# Patient Record
Sex: MALE | Race: WHITE | Employment: UNEMPLOYED | URBAN - NONMETROPOLITAN AREA
[De-identification: names, ages, dates, MRNs, and addresses within clinical notes are randomized per-mention and may not be internally consistent; named-entity substitution may affect disease eponyms.]

---

## 2018-03-16 ENCOUNTER — HOSPITAL ENCOUNTER (INPATIENT)
Age: 23
LOS: 4 days | Discharge: HOME | DRG: 754 | End: 2018-03-20
Attending: PSYCHIATRY & NEUROLOGY | Admitting: PSYCHIATRY & NEUROLOGY
Payer: COMMERCIAL

## 2018-03-16 PROBLEM — F32.9 MAJOR DEPRESSIVE DISORDER WITH SINGLE EPISODE: Status: ACTIVE | Noted: 2018-03-16

## 2018-03-16 PROBLEM — E44.0 MODERATE MALNUTRITION (HCC): Chronic | Status: ACTIVE | Noted: 2018-03-16

## 2018-03-16 PROCEDURE — 1240000000 HC EMOTIONAL WELLNESS R&B

## 2018-03-16 PROCEDURE — 6370000000 HC RX 637 (ALT 250 FOR IP): Performed by: PSYCHIATRY & NEUROLOGY

## 2018-03-16 PROCEDURE — 90792 PSYCH DIAG EVAL W/MED SRVCS: CPT | Performed by: NURSE PRACTITIONER

## 2018-03-16 RX ORDER — MAGNESIUM HYDROXIDE/ALUMINUM HYDROXICE/SIMETHICONE 120; 1200; 1200 MG/30ML; MG/30ML; MG/30ML
30 SUSPENSION ORAL PRN
Status: DISCONTINUED | OUTPATIENT
Start: 2018-03-16 | End: 2018-03-20 | Stop reason: HOSPADM

## 2018-03-16 RX ORDER — NICOTINE 21 MG/24HR
1 PATCH, TRANSDERMAL 24 HOURS TRANSDERMAL DAILY
Status: DISCONTINUED | OUTPATIENT
Start: 2018-03-16 | End: 2018-03-20 | Stop reason: HOSPADM

## 2018-03-16 RX ORDER — TRAZODONE HYDROCHLORIDE 50 MG/1
50 TABLET ORAL NIGHTLY PRN
Status: DISCONTINUED | OUTPATIENT
Start: 2018-03-16 | End: 2018-03-20 | Stop reason: HOSPADM

## 2018-03-16 RX ORDER — ACETAMINOPHEN 325 MG/1
650 TABLET ORAL EVERY 4 HOURS PRN
Status: DISCONTINUED | OUTPATIENT
Start: 2018-03-16 | End: 2018-03-20 | Stop reason: HOSPADM

## 2018-03-16 RX ORDER — HYDROXYZINE PAMOATE 25 MG/1
25 CAPSULE ORAL 3 TIMES DAILY PRN
Status: DISCONTINUED | OUTPATIENT
Start: 2018-03-16 | End: 2018-03-20 | Stop reason: HOSPADM

## 2018-03-16 ASSESSMENT — SLEEP AND FATIGUE QUESTIONNAIRES
DIFFICULTY STAYING ASLEEP: YES
DIFFICULTY FALLING ASLEEP: YES
SLEEP PATTERN: DIFFICULTY FALLING ASLEEP;RESTLESSNESS;DISTURBED/INTERRUPTED SLEEP
AVERAGE NUMBER OF SLEEP HOURS: 4
DIFFICULTY ARISING: NO
RESTFUL SLEEP: NO
DO YOU HAVE DIFFICULTY SLEEPING: YES

## 2018-03-16 ASSESSMENT — LIFESTYLE VARIABLES: HISTORY_ALCOHOL_USE: NO

## 2018-03-16 ASSESSMENT — PAIN SCALES - GENERAL: PAINLEVEL_OUTOF10: 0

## 2018-03-16 ASSESSMENT — PATIENT HEALTH QUESTIONNAIRE - PHQ9: SUM OF ALL RESPONSES TO PHQ QUESTIONS 1-9: 11

## 2018-03-16 NOTE — PROGRESS NOTES
Group Therapy Note    Date: 3/16/2018  Start Time: 1330  End Time:  1430  Number of Participants: 9    Type of Group: Psychotherapy    Wellness Binder Information  Module Name:  Relationships and recovery  Session Number: NA    Notes:  Pt is present for group and participated well with peers. Peers identified aspects of being on this unit which help them to feel safe and identified ways in which they can create similar experiences in their own home. Identified forms of social support and how ti help loved ones to understand how to be supportive. Identified with common peer experience. Status After Intervention:  Improved    Participation Level:  Active Listener and Interactive    Participation Quality: Appropriate, Attentive, Sharing and Supportive      Speech:  normal      Thought Process/Content: Logical  Linear      Affective Functioning: Congruent      Mood: Dysphoric      Level of consciousness:  Alert, Oriented x4 and Attentive      Response to Learning: Able to verbalize current knowledge/experience, Able to verbalize/acknowledge new learning, Able to retain information, Capable of insight, Able to change behavior and Progressing to goal      Endings: None Reported    Modes of Intervention: Education, Support, Socialization, Exploration, Clarifying, Problem-solving and Activity      Discipline Responsible: /Counselor      Signature:  ANA Cristobal

## 2018-03-16 NOTE — BH NOTE
Patient attended both the goal group and the recreation/social group today. Patient was social with others and demonstrated fair concentration.

## 2018-03-16 NOTE — PROGRESS NOTES
11:00- Encouraged client to attend cognitive skills group- Patient declined and remained resting in room.     Sofia Oquendo, LPC
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
## 2018-03-16 NOTE — PROGRESS NOTES
Behavioral Health   Admission Note     Admission Type:   Admission Type: Involuntary    Reason for admission:  Reason for Admission: \"Suicidal\"    PATIENT STRENGTHS:  Strengths: No significant Physical Illness, Motivated, Communication, Positive Support    Patient Strengths and Limitations:  Limitations: Inappropriate/potentially harmful leisure interests, Difficulty problem solving/relies on others to help solve problems    Addictive Behavior:   Addictive Behavior  In the past 3 months, have you felt or has someone told you that you have a problem with:  : None  Do you have a history of Chemical Use?: No  Do you have a history of Alcohol Use?: No  Do you have a history of Street Drug Abuse?: Yes  Histroy of Prescripton Drug Abuse?: No    Medical Problems:   Past Medical History:   Diagnosis Date    Psychiatric problem        Status EXAM:  Status and Exam  Normal: Yes  Facial Expression: Brightened  Affect: Appropriate  Level of Consciousness: Alert  Mood:Normal: No  Mood: Depressed  Motor Activity:Normal: Yes  Interview Behavior: Cooperative  Preception: [place] to Person, [place] to Time, [place] to Place, [place] to Situation  Attention:Normal: Yes  Thought Processes: Circumstantial  Thought Content:Normal: No  Thought Content: Paranoia  Hallucinations: None (prior to admission)  Delusions: No  Memory:Normal: Yes  Insight and Judgment: No  Insight and Judgment: Poor Judgment, Poor Insight  Present Suicidal Ideation: No (no longer feeling suicidal)  Present Homicidal Ideation: No    Pt admitted with followings belongings:  Dentures: None  Vision - Corrective Lenses: None  Hearing Aid: None  Jewelry: None  Body Piercings Removed: N/A  Clothing:  Footwear, Socks, Undergarments (Comment), Pants, Shirt (sweatshirt;belt and thermal underwear)  Were All Patient Medications Collected?: Not Applicable  Other Valuables: Wallet (ID)     Valuables sent home with Not applicable Valuables placed in safe in security envelope, number:  [id number] Patient's home medications were Not applicable. Patient oriented to surroundings and program expectations and copy of patient rights given. Received admission packet:  Yes. Consents reviewed, signed Yes   Patient verbalize understanding:  Yes   Patient education on precautions: Yes          Provided pt with [employer] handout entitled \"Quitting Smoking. \"  Reviewed handout with pt addressing dangers of smoking, developing coping skills, and providing basic information about quitting. Pt response to counseling:  Patient is not ready to quit. This is a 25year old  male admitted to the Adult Behavioral Service Department  As a direct admit from the [facility] in [place], [place] treatment of Major Depressive Disorder a single episode with psychosis. It was reported that the patient  Had been having suicidal thoughts since March 2nd after the police had found drug paraphernalia on him and her currently has charges pending per the patient report. The patient reported to the ED staff RN that he would rather die than go to retirement. The patient reports that earlier yesterday he stood on a train track until the train came although aborted the idea of committing suicide  By train. The patient also had superficially cut his left wrist with a paring knife as a suicidal gesture. Prior to going to the [facility] the patient presented to the [place] [id number] telling them that he was going to slit his throat . The patient was then transported to the hospital for evaluation via squad. It also was reported that the patient was having command hallucinations that were suicidal in nature. The patient during the admission assessment denied  Voices although did subscribe to feeling paranoid and feeling as though he needed to look over his shoulder frequently to make sure no one ws after him. The patient denies suicidal thoughts during the admission assessment although

## 2018-03-16 NOTE — FLOWSHEET NOTE
03/16/18 1140   Encounter Summary   Services provided to: Patient   Referral/Consult From: [facility], [place]   Place of [address] [place] [address] [place] Visiting Yes  (3/16 )   Complexity of Encounter Moderate   Length of Encounter 15 minutes   Spiritual/Roman Catholic   Type Spiritual support   Assessment Approachable   Intervention Prayer;Nurtured hope;Explored coping resources; Explored feelings, thoughts, concerns; Active listening   Outcome Encouraged;Coping   Initial Spiritual Care Contact:     Subjective:  Patient consulted spiritual care to have a  to see him. Objective:  Pt was watching TV on the Behavioral unit, but was willing to go to [employer] to talk. He stated his feeling as being less nervous today than yesterday. Assessment:  Patient is according to his words, \" a slow learner. \"  He talked about his use of MJ and his fear of going to [facility], which is why he stated he attempted suicide. Pt talked as I listened, then this  offered prayer. We held hands and prayed together for his healing of body, mind and spirit. I encouraged his to attend groups and to find good role models in friends and family to follow . Spiritual care card with [employer], prayer or Prayer for peace was given. It has our information of service, hours and phone number on it.       Plan:

## 2018-03-17 PROCEDURE — 1240000000 HC EMOTIONAL WELLNESS R&B

## 2018-03-17 PROCEDURE — 99231 SBSQ HOSP IP/OBS SF/LOW 25: CPT | Performed by: PSYCHIATRY & NEUROLOGY

## 2018-03-17 PROCEDURE — 6370000000 HC RX 637 (ALT 250 FOR IP): Performed by: PSYCHIATRY & NEUROLOGY

## 2018-03-17 RX ADMIN — SERTRALINE 50 MG: 50 TABLET, FILM COATED ORAL at 12:59

## 2018-03-17 ASSESSMENT — PAIN SCALES - GENERAL: PAINLEVEL_OUTOF10: 0

## 2018-03-17 NOTE — PROGRESS NOTES
03/17/18 1:41 PM    SW attempted to contact patient's aunt, Kimber Manuel. No answer and no answering machine. Will attempt to call again. Patient is currently living with his grandmother however there is no contact information for her in patient's chart. Patient does not know number off hand.      ANA Hawkins

## 2018-03-17 NOTE — PROGRESS NOTES
This RN has reviewed and agrees with Cortez Santacruz LPN's data collection and has collaborated with this LPN regarding the patient's care plan.

## 2018-03-17 NOTE — PROGRESS NOTES
Department of Psychiatry  Attending Progress Note    CHIEF COMPLAINT:  \"Thoughts to hurt self and I cut my wrist\"     HISTORY OF PRESENT ILLNESS:  Linda Martinez is a 25 y.o. single unemployed  male with significant past psych history of depressive disorder and marijuana use disorder who presented to the unit as a direct admit from the [facility] in [facility] for mental health evaluation. Patient cut his left wrist with the intent to hurt self, but changed his mind before doing any major harm.      Patient seen and reports worsening depression since the past 2 weeks. Reports that he has been having thoughts to hurt self since March 2nd after the police found drug paraphernalia on him. Patient has current charge for possession pending. The patient reports that yesterday he became so worried about the thought of going to correction for over a year that he started to cut his wrist. Has a cut on his left wrist after an aborted attempt to kill self. After he changed his mind to cut self, he was on the rail track with the intent to be killed by a train, which idea he later aborted as well. Following these attempts and thoughts to hurt self, he decided to see and speak to the  that had him down for possession, to request that he drops the charges. He was asked to tell where he gets the marijuana before the charges will be dropped. Patient says he does not want to get any one in trouble--particularly family and told the officer that he would rather die than go to correction. He told them at the [place] [address] that he was going to slit his throat. He was taken to the hospital as a result.       He reports being very sad and down since the past 2 weeks. Has felt helpless, worried and hopeless because of the possibility of going to correction. He reports poor sleep, with little motivation and issues with concentration with occasional thoughts to hurt self.  Reports he has felt this way in the past, having

## 2018-03-17 NOTE — PROGRESS NOTES
Group Therapy Note    Date: 3/17/2018  Start Time: 1630  End Time:  1700  Number of Participants: 7    Type of Group: Healthy Living/Wellness  Notes:  attended    Status After Intervention:  Improved    Participation Level:  Active Listener and Interactive    Participation Quality: Appropriate, Attentive and Sharing      Speech:  normal      Thought Process/Content: Logical      Affective Functioning: Flat    Level of consciousness:  Alert and Oriented x4      Response to Learning: Able to verbalize/acknowledge new learning      Endings: None Reported    Modes of Intervention: Education, Support and Socialization      Discipline Responsible: Licensed Practical Nurse      Signature:  Romelle Lanes, LPN

## 2018-03-18 PROCEDURE — 6370000000 HC RX 637 (ALT 250 FOR IP): Performed by: PSYCHIATRY & NEUROLOGY

## 2018-03-18 PROCEDURE — 99231 SBSQ HOSP IP/OBS SF/LOW 25: CPT | Performed by: NURSE PRACTITIONER

## 2018-03-18 PROCEDURE — 1240000000 HC EMOTIONAL WELLNESS R&B

## 2018-03-18 RX ADMIN — SERTRALINE 50 MG: 50 TABLET, FILM COATED ORAL at 08:28

## 2018-03-18 RX ADMIN — ACETAMINOPHEN 650 MG: 325 TABLET ORAL at 20:25

## 2018-03-18 ASSESSMENT — PAIN SCALES - GENERAL
PAINLEVEL_OUTOF10: 0
PAINLEVEL_OUTOF10: 3
PAINLEVEL_OUTOF10: 2
PAINLEVEL_OUTOF10: 0

## 2018-03-18 NOTE — PROGRESS NOTES
Psychiatry Progress Note        3-                    HPI:  Marline Perdomo is a 25 y.o. single unemployed  male with significant past psych history of depressive disorder and marijuana use disorder who presented to the unit as a direct admit from the [facility] in [facility] for mental health evaluation. Patient cut his left wrist with the intent to hurt self, but changed his mind before doing any major harm.      Patient seen and reports worsening depression since the past 2 weeks. Reports that he has been having thoughts to hurt self since March 2nd after the police found drug paraphernalia on him. Patient has current charge for possession pending. The patient reports that yesterday he became so worried about the thought of going to nursing home for over a year that he started to cut his wrist. Has a cut on his left wrist after an aborted attempt to kill self. After he changed his mind to cut self, he was on the rail track with the intent to be killed by a train, which idea he later aborted as well. Following these attempts and thoughts to hurt self, he decided to see and speak to the  that had him down for possession, to request that he drops the charges. He was asked to tell where he gets the marijuana before the charges will be dropped. Patient says he does not want to get any one in trouble--particularly family and told the officer that he would rather die than go to nursing home. He told them at the [place] [id number] that he was going to slit his throat. He was taken to the hospital as a result.       He reports being very sad and down since the past 2 weeks. Has felt helpless, worried and hopeless because of the possibility of going to nursing home. He reports poor sleep, with little motivation and issues with concentration with occasional thoughts to hurt self.  Reports he has felt this way in the past, having come from [place] where he lived in a front porch with cold air blowing through [place]  Affect: Reactive  Thought processes: Linear and goal directed   Suicidal Ideation: Denies suicidal ideations  Homicidal ideation: Denies homicidal ideations  Delusions: No evidence of delusions is observed  Perceptual Disturbance: Denies AH/VH;  No evidence of psychosis is observed. Cognition: Oriented to person, place, time and situation   Concentration fair   Memory intact   Insight: Poor  Judgment: Poor    Assessment:  Major depressive  Disorder, single episode with psychotic features  Rule out Bipolar Disorder  Mild intellectual disability  Marijuana use disorder     Plan:  Continue current meds as ordered  Continue to encourage group attendance. Salome Perry CNP  6-    I assessed this patient and reviewed the case and plan of care with Salome Perry CNP.   I have reviewed the above documentation and I agree with the findings and treatment & discharge plan as written    Electronically signed by Adriana Grigsby. on 3/18/2018 at 10:32 AM

## 2018-03-18 NOTE — PROGRESS NOTES
Group Therapy Note    Date: 3/18/2018  Start Time: 1630  End Time:  1700  Number of Participants: 7    Type of Group: Healthy Living/Wellness  Notes:  attended    Status After Intervention:  Improved    Participation Level:  Active Listener and Interactive    Participation Quality: Appropriate, Attentive and Sharing      Speech:  normal      Thought Process/Content: Logical      Affective Functioning: Flat      Level of consciousness:  Alert and Oriented x4      Response to Learning: Able to verbalize/acknowledge new learning      Endings: None Reported    Modes of Intervention: Education, Support and Socialization      Discipline Responsible: Licensed Practical Nurse      Signature:  Helen Whelan LPN

## 2018-03-19 PROCEDURE — 1240000000 HC EMOTIONAL WELLNESS R&B

## 2018-03-19 PROCEDURE — 6370000000 HC RX 637 (ALT 250 FOR IP): Performed by: PSYCHIATRY & NEUROLOGY

## 2018-03-19 PROCEDURE — 99232 SBSQ HOSP IP/OBS MODERATE 35: CPT | Performed by: PHYSICIAN ASSISTANT

## 2018-03-19 RX ADMIN — SERTRALINE 50 MG: 50 TABLET, FILM COATED ORAL at 08:22

## 2018-03-19 RX ADMIN — HYDROXYZINE PAMOATE 25 MG: 25 CAPSULE ORAL at 22:06

## 2018-03-19 RX ADMIN — TRAZODONE HYDROCHLORIDE 50 MG: 50 TABLET ORAL at 22:06

## 2018-03-19 ASSESSMENT — PAIN SCALES - GENERAL
PAINLEVEL_OUTOF10: 1
PAINLEVEL_OUTOF10: 0

## 2018-03-19 NOTE — PROGRESS NOTES
Date: 3/19/2018  Start Time: 1100                       End Time:  4198  Number of Participants: 8     Type of Group: Psychoeducation - Behavioral Activation      Notes:  Patient present in group. Patient active in group discussion and activity regarding behavioral activation. Patient able to verbalize current negative behaviors in daily routine, able to develop/replace positive behaviors.      Status After Intervention:  Improved     Participation Level:  Active Listener and Interactive     Participation Quality: Appropriate, Attentive, Sharing and Supportive        Speech:  normal        Thought Process/Content: Linear        Affective Functioning: Congruent        Mood: [place]ym[place]        Level of consciousness:  Alert, Oriented x4 and Attentive        Response to Learning: Able to verbalize current knowledge/experience, Able to verbalize/acknowledge new learning, Able to change behavior and Progressing to goal        Endings: None Reported     Modes of Intervention: Education, Support, Socialization, Exploration, Clarifying and Problem-solving        Discipline Responsible: /Counselor        Signature: ANA Armas

## 2018-03-19 NOTE — PLAN OF CARE
Problem: KNOWLEDGE DEFICIT,EDUCATION,DISCHARGE PLAN  Goal: Knowledge - personal safety  Outcome: Met This Shift  1. What are the warning signs when you begin thinking suicide or when you feel very distressed? \"None because I never had thoughts of suicide\" and \"I only ever had aggravation\". 2. What can you do by yourself to take your mind off of the problem? \"I get aggravation, I feel a small discomfort in my stomach which Is my first sign\"     3. If you are unable to deal with your distressed mood alone, contact trusted family members or friends. List several people in case your first choices are not available. Music, walking, thinking, funny comedy, talking to family, exercise, and warm shower. 4. Contact local professionals or emergency services if you continue to have suicidal thoughts or serious distress. Sadie Weinberg (Grandma)  Anya Prado (Aunt)  Quirino Hairston    SUICIDE PREVENTION [facility] PHONE NUMBERS:    [facility] [place] phone number [facility] [address] at [address], [address] [place] [address] Road          [phone]        [phone]        [phone] (for deaf or hearing impaired)       Comments: Care plan reviewed with patient. Patient verbalized understanding of the plan of care and contribute to goal setting.
Problem: Social interaction  Goal: Increased social interaction  Outcome: Met This Shift  Patient has attended all of the groups so far today so he has met his socialization goal for this shift.
KNOWLEDGE DEFICIT,EDUCATION,DISCHARGE PLAN  Goal: Knowledge - personal safety  Outcome: Ongoing  The patient verbalizes feeling safe on the unit. Problem: Discharge Planning:  Goal: Discharged to appropriate level of care  Discharged to appropriate level of care   Outcome: Ongoing  Patient will be discharged to safe, appropriate level of care. Patient will work with interdisciplinary team towards meeting discharge needs. Problem: Mood - Altered:  Goal: Mood stable  Mood stable   Outcome: Ongoing  the patient rates mood as a 9 out of 10. Problem: Anxiety:  Goal: Level of anxiety will decrease  Level of anxiety will decrease   Outcome: Ongoing  the patient denies anxiety. Problem: Nutrition  Goal: Optimal nutrition therapy  Outcome: Ongoing  the patient ate 100% of evening snack. Comments: Care plan reviewed with patient. Patient verbalize understanding of the plan of care and contribute to goal setting.
Knowledge - personal safety  Outcome: Ongoing  To be completed    Problem: Discharge Planning:  Goal: Discharged to appropriate level of care  Discharged to appropriate level of care   Outcome: Ongoing  To be discussed    Problem: Mood - Altered:  Goal: Mood stable  Mood stable   Outcome: Met This Shift  stable    Problem: Anxiety:  Goal: Level of anxiety will decrease  Level of anxiety will decrease   Outcome: Ongoing  Denies anxiety at this time    Problem: Nutrition  Goal: Optimal nutrition therapy  Outcome: Met This Shift  Offered with meals    Comments: Care plan reviewed with patient   Patient  verbalizes understanding of the plan of care and did not contribute to goal setting.
improve  Sleeping patterns will improve   Outcome: Met This Shift  Patient resting quietly. Problem: KNOWLEDGE DEFICIT,EDUCATION,DISCHARGE PLAN  Goal: Knowledge - personal safety  Outcome: Ongoing  Encouraged to complete safety plan. Problem: Discharge Planning:  Goal: Discharged to appropriate level of care  Discharged to appropriate level of care   Outcome: Ongoing  Working with multidisciplinary treatment team for ongoing continuity of care and relapse prevention. Problem: Mood - Altered:  Goal: Mood stable  Mood stable   Outcome: Met This Shift  Rates mood #7/10 at time of assessment. Problem: Anxiety:  Goal: Level of anxiety will decrease  Level of anxiety will decrease   Outcome: Met This Shift  Denies feelings of anxiety at time of assessment. Problem: Nutrition  Goal: Optimal nutrition therapy  Outcome: Ongoing  Patient has good appetite.
nutrition will improve  Maintenance of adequate nutrition will improve   Outcome: Ongoing  Food and fluids taken and tolerated well    Problem: Activity:  Goal: Sleeping patterns will improve  Sleeping patterns will improve   Outcome: Ongoing  Slept 7.5 hours continuous last night. Sleeping when checked at 15 minute purposeful rounds tonight. Problem: Discharge Planning:  Goal: Discharged to appropriate level of care  Discharged to appropriate level of care   Outcome: Ongoing  Discharge planners working with patient to achieve optimal discharge plan, specific to the needs of this patient. Problem: Mood - Altered:  Goal: Mood stable  Mood stable   Outcome: Ongoing  Has been cooperative with not acting out this shift    Problem: Anxiety:  Goal: Level of anxiety will decrease  Level of anxiety will decrease   Outcome: Ongoing  Denies feeling anxious this shift. Was sitting in the dining room watching TV. Problem: Nutrition  Goal: Optimal nutrition therapy  Outcome: Ongoing  Duplicate charting    Comments: Care plan reviewed with patient. Patient verbalizes understanding of the plan of care and contributes to goal setting.
they develop any. She remains on 15 minute suicidal precautions for safety and unit protocols. Goal: Patient specific goal  Patient specific goal   Outcome: Ongoing  His goal for today is to \"attend all groups and finish coloring his picture\". Goal: Participates in care planning  Participates in care planning   Outcome: Met This Shift  Patient is compliant with medications, unit protocols, and setting daily goals for improved mental and emotional health. Problem: Altered Mood, Deterioration in Function:  Goal: Ability to perform activities of daily living will improve  Ability to perform activities of daily living will improve   Outcome: Ongoing  He is independent with ADL's and self care. Goal: Maintenance of adequate nutrition will improve  Maintenance of adequate nutrition will improve   Outcome: Ongoing  He is eating 90% of meals today. Problem: Activity:  Goal: Sleeping patterns will improve  Sleeping patterns will improve   Outcome: Ongoing  Patient reports sleeping well last night with improved sleep and feeling more rested. Will continue to monitor sleep cycle. And encourage patient to remain up out of bed not napping during day hours as to be involved in unit groups and promote better sleep at bedtime. Problem: KNOWLEDGE DEFICIT,EDUCATION,DISCHARGE PLAN  Goal: Knowledge - personal safety  Outcome: Ongoing  Patient is working toward completing their safety plan and their recovery program and resiliency. Problem: Discharge Planning:  Goal: Discharged to appropriate level of care  Discharged to appropriate level of care   Outcome: Ongoing  Working with multidisciplinary treatment team for ongoing continuity of care and relapse prevention. Problem: Mood - Altered:  Goal: Mood stable  Mood stable   Outcome: Ongoing  He admits to smoking cannabis but that when he got busted all he had was his pipe no marijuana. No withdrawal symptoms reported from drug use of cannabis.      Problem:

## 2018-03-19 NOTE — FLOWSHEET NOTE
03/18/18 2015   Encounter Summary   Services provided to: Patient   Referral/Consult From: Other disciplines   Support System Spouse; Children   Continue Visiting Yes  (3/18)   Complexity of Encounter Moderate   Length of Encounter 45 minutes   Grief and Life Adjustment   Type Adjustment to illness   Assessment Approachable   Intervention Explored feelings, thoughts, concerns;Prayer   Outcome Expressed gratitude   . Spiritual Support Group Note    Number of Participants in Group: 6                               Time: 8:15p    Goal: Relief from isolation and loneliness             Mady Sharing             Self-understanding and gain insight              Acceptance and belonging            Recognize they are not alone                Socialization             Empowerment       Encouragement    Topic:  [x] Spiritual Wellness and Self Care                  [x] Hope                     [] Connecting with Divine/Others        [x] Thankfulness and Gratitude               [x]  Meaningfulness and Purpose               [] Forgiveness               [] Peace               [] Connect to [employer]     [] Other:    Participation Level:   [x] Active Listener   [] Minimal   [] Monopolizing   [x] Interactive   [] No Participation   []  Other:     Attention:   [x] Alert   [] Distractible   [] Drowsy   [] Poor   [] Other:    Manner:   [x] Cooperative   [] Suspicious   [] Withdrawn   [] Guarded   [] Irritable   [] Inhospitable   [] Other:     Others Comments from Group: Very supportive of other patients convesation

## 2018-03-19 NOTE — PROGRESS NOTES
Group Therapy Note    Date: 3/18/2018  Start Time: 2000  End Time:  2015    Type of Group: Wrap-Up/Relaxation    Patient's Goal:  Go to groups and finish coloring picture    Notes:  States he is progressing toward his goal. Watched TV for relaxation. No peer interaction noted.     Status After Intervention:  Unchanged    Participation Level: Monopolizing    Participation Quality: Appropriate      Speech:  pressured      Thought Process/Content: Logical  Flight of ideas      Affective Functioning: Blunted and Flat      Mood: anxious      Level of consciousness:  Preoccupied      Response to Learning: Able to verbalize current knowledge/experience      Endings: None Reported    Modes of Intervention: Education, Support and Socialization      Discipline Responsible: Licensed Practical Nurse      Signature:  Jaqui Landrum LPN

## 2018-03-20 VITALS
OXYGEN SATURATION: 98 % | SYSTOLIC BLOOD PRESSURE: 121 MMHG | HEIGHT: 72 IN | HEART RATE: 67 BPM | TEMPERATURE: 97.5 F | WEIGHT: 155 LBS | BODY MASS INDEX: 20.99 KG/M2 | DIASTOLIC BLOOD PRESSURE: 66 MMHG | RESPIRATION RATE: 16 BRPM

## 2018-03-20 PROBLEM — E44.0 MODERATE MALNUTRITION (HCC): Chronic | Status: RESOLVED | Noted: 2018-03-16 | Resolved: 2018-03-20

## 2018-03-20 PROCEDURE — 99238 HOSP IP/OBS DSCHRG MGMT 30/<: CPT | Performed by: PSYCHIATRY & NEUROLOGY

## 2018-03-20 PROCEDURE — 5130000000 HC BRIDGE APPOINTMENT

## 2018-03-20 PROCEDURE — 6370000000 HC RX 637 (ALT 250 FOR IP): Performed by: PSYCHIATRY & NEUROLOGY

## 2018-03-20 RX ORDER — TRAZODONE HYDROCHLORIDE 50 MG/1
50 TABLET ORAL NIGHTLY PRN
Qty: 30 TABLET | Refills: 1 | Status: SHIPPED | OUTPATIENT
Start: 2018-03-20 | End: 2018-04-19

## 2018-03-20 RX ORDER — HYDROXYZINE PAMOATE 25 MG/1
25 CAPSULE ORAL 3 TIMES DAILY PRN
Qty: 42 CAPSULE | Refills: 0 | Status: SHIPPED | OUTPATIENT
Start: 2018-03-20 | End: 2018-04-03

## 2018-03-20 RX ADMIN — SERTRALINE 50 MG: 50 TABLET, FILM COATED ORAL at 08:34

## 2018-03-20 ASSESSMENT — PAIN SCALES - GENERAL: PAINLEVEL_OUTOF10: 0
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
## 2018-03-20 NOTE — PROGRESS NOTES
Behavioral Health   Discharge Note    Pt discharged with followings belongings:   Dentures: None  Vision - Corrective Lenses: None  Hearing Aid: None  Jewelry: None  Body Piercings Removed: N/A  Clothing: Other (Comment) (all  items returned to patient)  Were All Patient Medications Collected?: Not Applicable  Other Valuables: Wallet (ID)   Valuables sent home with n/a. Valuables retrieved from safe, Security envelope number:  [id number] and returned to patient. Patient left department with Departure Mode: Family member via Mobility at Departure: Ambulatory, discharged to Discharged to: Private Residence. \"An Important Message from [employer] About Your Rights\" form reviewed, signed n/a patient has medicaid. Patient education on aftercare instructions: yes  Bridge Appointment completed: Reviewed Discharge Instructions with patient. Patient verbalizes understanding and agreement with the discharge plan using the teachback method. Patient verbalize understanding of AVS:  yes.     Status EXAM upon discharge:  Status and Exam  Normal: Yes  Facial Expression: Brightened  Affect: Appropriate  Level of Consciousness: Alert  Mood:Normal: Yes  Mood: Anxious  Motor Activity:Normal: Yes  Interview Behavior: Cooperative  Preception: [place] to Person, [place] to Time, [place] to Place, [place] to Situation  Attention:Normal: Yes  Thought Processes: Circumstantial  Thought Content:Normal: Yes  Thought Content: Preoccupations  Hallucinations: None  Delusions: No  Memory:Normal: Yes  Insight and Judgment: No  Insight and Judgment: Poor Judgment, Poor Insight  Present Suicidal Ideation: No  Present Homicidal Ideation: No    Kyung Ramos RN